# Patient Record
(demographics unavailable — no encounter records)

---

## 2024-10-18 NOTE — HISTORY OF PRESENT ILLNESS
[Mother] : mother [Breast milk] : breast milk [Formula ___ oz/feed] : [unfilled] oz of formula per feed [Hours between feeds ___] : Child is fed every [unfilled] hours [___ Feeding per 24 hrs] : a  total of [unfilled] feedings in 24 hours [Normal] : Normal [In Bassinet/Crib] : sleeps in bassinet/crib [On back] : sleeps on back [Tummy time] : tummy time [Screen time only for video chatting] : screen time only for video chatting [No] : No cigarette smoke exposure [Water heater temperature set at <120 degrees F] : Water heater temperature set at <120 degrees F [Rear facing car seat in back seat] : Rear facing car seat in back seat [Carbon Monoxide Detectors] : Carbon monoxide detectors at home [Smoke Detectors] : Smoke detectors at home. [NO] : No [Co-sleeping] : no co-sleeping [FreeTextEntry7] : 4 month old presenting for well visit. Had COVID19 infection, now resolved.  [de-identified] : No acute concerns. [de-identified] : No solids  [FreeTextEntry1] : SDOH Screening Questionnaire SDOH (Social Determinants of Health) Questionnaire: 1. Housing: Do you worry that in the upcoming months, your family, or child, may not have a safe or stable place to live? no 2. Food security: Within the last 12 months, did the food you bought not last and you did not have money to buy more? no 3. Community: Do you need help getting public benefits like food stamps or WIC? no 4. Transportation: Does your child have chronic medical condition and therefore struggle with transportation to attend medical appointments? no 5. Healthcare Access: Do you need help getting health or dental insurance? no Result: Negative Screen. No further intervention needed.

## 2024-10-18 NOTE — DISCUSSION/SUMMARY
[Family Functioning] : family functioning [Nutritional Adequacy and Growth] : nutritional adequacy and growth [Infant Development] : infant development [Oral Health] : oral health [Safety] : safety [] : The components of the vaccine(s) to be administered today are listed in the plan of care. The disease(s) for which the vaccine(s) are intended to prevent and the risks have been discussed with the caretaker.  The risks are also included in the appropriate vaccination information statements which have been provided to the patient's caregiver.  The caregiver has given consent to vaccinate. [FreeTextEntry1] : Assessment: 4-month-old FEMALE, with resolved COVID19 infection, presents for well visit. Maternal depression screen passed. Growth and Development appropriate for age. No head lag, ready for introduction of solid foods.  PLAN ROUTINE HEALTH CARE VISIT: Age appropriate anticipatory guidance provided. Infant feeding discussed. Continue BF or Formula. 8-12 feedings / day. No head lag: Discussed Introduction of solids foods q 3 -4 days. Stage 1. Avoid whole milk and honey until 1 year of age. Avoid choking hazards such as: nuts, hot dogs, un-cut grapes, hot dogs, fruits with skins, hard candies and balloons.  Teething care reviewed. MultiVitamin Daily 1 mL daily. Continue monitored tummy time for upper body control. Immunizations: Agreeable to Pentacel, Prevnar and Rota at this visit. Return to clinic in 2 months for WCC  & PRN.  Caretaker verbalized understanding of the aforementioned plan above. Caregiver in agreement of the plan. All questions answered.

## 2024-10-18 NOTE — PHYSICAL EXAM
[Alert] : alert [Normocephalic] : normocephalic [Flat Open Anterior [place]] : flat open anterior fontanelle [Red Reflex] : red reflex bilateral [PERRL] : PERRL [Normally Placed Ears] : normally placed ears [Auricles Well Formed] : auricles well formed [Clear Tympanic membranes] : clear tympanic membranes [Light reflex present] : light reflex present [Bony landmarks visible] : bony landmarks visible [Nares Patent] : nares patent [Palate Intact] : palate intact [Uvula Midline] : uvula midline [Symmetric Chest Rise] : symmetric chest rise [Clear to Auscultation Bilaterally] : clear to auscultation bilaterally [Regular Rate and Rhythm] : regular rate and rhythm [S1, S2 present] : S1, S2 present [+2 Femoral Pulses] : (+) 2 femoral pulses [Soft] : soft [Bowel Sounds] : bowel sounds present [External Genitalia] : normal external genitalia [Normal Vaginal Introitus] : normal vaginal introitus [Patent] : patent [Normally Placed] : normally placed [No Abnormal Lymph Nodes Palpated] : no abnormal lymph nodes palpated [Startle Reflex] : startle reflex present [Plantar Grasp] : plantar grasp reflex present [Symmetric Lisa] : symmetric lisa [Acute Distress] : no acute distress [Discharge] : no discharge [Palpable Masses] : no palpable masses [Murmurs] : no murmurs [Tender] : nontender [Distended] : nondistended [Hepatomegaly] : no hepatomegaly [Splenomegaly] : no splenomegaly [Clitoromegaly] : no clitoromegaly [Newsome-Ortolani] : negative Newsome-Ortolani [Allis Sign] : negative Allis sign [Spinal Dimple] : no spinal dimple [Tuft of Hair] : no tuft of hair [Rash or Lesions] : no rash/lesions [de-identified] : nevus simplex

## 2024-12-18 NOTE — PHYSICAL EXAM
[Alert] : alert [Normocephalic] : normocephalic [Flat Open Anterior [place]] : flat open anterior fontanelle [Red Reflex] : red reflex bilateral [PERRL] : PERRL [Normally Placed Ears] : normally placed ears [Auricles Well Formed] : auricles well formed [Clear Tympanic membranes] : clear tympanic membranes [Light reflex present] : light reflex present [Bony landmarks visible] : bony landmarks visible [Nares Patent] : nares patent [Palate Intact] : palate intact [Uvula Midline] : uvula midline [Supple, full passive range of motion] : supple, full passive range of motion [Symmetric Chest Rise] : symmetric chest rise [Clear to Auscultation Bilaterally] : clear to auscultation bilaterally [Regular Rate and Rhythm] : regular rate and rhythm [S1, S2 present] : S1, S2 present [+2 Femoral Pulses] : (+) 2 femoral pulses [Soft] : soft [Bowel Sounds] : bowel sounds present [Normal External Genitalia] : normal external genitalia [Normal Vaginal Introitus] : normal vaginal introitus [Patent] : patent [Normally Placed] : normally placed [No Abnormal Lymph Nodes Palpated] : no abnormal lymph nodes palpated [Symmetric Buttocks Creases] : symmetric buttocks creases [Plantar Grasp] : plantar grasp reflex present [Cranial Nerves Grossly Intact] : cranial nerves grossly intact [Acute Distress] : no acute distress [Discharge] : no discharge [Tooth Eruption] : no tooth eruption [Palpable Masses] : no palpable masses [Murmurs] : no murmurs [Tender] : nontender [Distended] : nondistended [Hepatomegaly] : no hepatomegaly [Splenomegaly] : no splenomegaly [Clitoromegaly] : no clitoromegaly [Newsome-Ortolani] : negative Newsome-Ortolani [Allis Sign] : negative Allis sign [Spinal Dimple] : no spinal dimple [Tuft of Hair] : no tuft of hair [Rash or Lesions] : no rash/lesions

## 2024-12-18 NOTE — HISTORY OF PRESENT ILLNESS
[Mother] : mother [Breast milk] : breast milk [Formula ___ oz/feed] : [unfilled] oz of formula per feed [___ voids per day] : [unfilled] voids per day [Frequency of stools: ___] : Frequency of stools: [unfilled]  stools [per day] : per day. [Dark green] : dark green [Yellow] : yellow [In Bassinet/Crib] : sleeps in bassinet/crib [On back] : sleeps on back [Sleeps 12-16 hours per 24 hours (including naps)] : sleeps 12-16 hours per 24 hours (including naps) [Tummy time] : tummy time [No] : No cigarette smoke exposure [Water heater temperature set at <120 degrees F] : Water heater temperature set at <120 degrees F [Rear facing car seat in back seat] : Rear facing car seat in back seat [Carbon Monoxide Detectors] : Carbon monoxide detectors at home [Smoke Detectors] : Smoke detectors at home. [NO] : No [Cereal] : cereal [Fruits] : no fruits [Vegetables] : no vegetables [Egg] : no egg [Meat] : no meat [Fish] : no fish [Peanut] : no peanut [Dairy] : no dairy [Co-sleeping] : no co-sleeping [Loose bedding, pillow, toys, and/or bumpers in crib] : no loose bedding, pillow, toys, and/or bumpers in crib [Pacifier use] : not using pacifier [Screen time only for video chatting] : screen time not just for video chatting [Exposure to electronic nicotine delivery system] : No exposure to electronic nicotine delivery system [de-identified] : Child here for WCC. Mom also informed us that child had viral infection with fever that brought her to the ER 2 months ago [de-identified] : excessive mucous production [de-identified] : 20 minutes each side breast fed every 3 hours, eats oatmel baby cereal ( [place]) as well as puree 4 bottle feeds per day an hour after breast, formula is gentlelease  [de-identified] : no screen time at all [FreeTextEntry1] : Child here for [facility]. Mom also informed us that child had viral infection with fever that brought her to the ER 2 months ago    SDOH Screening Questionnaire  SDOH (Social Determinants of Health) Questionnaire: 1. Housing: Do you worry that in the upcoming months, your family, or child, may not have a safe or stable place to live? No  2. Food security: Within the last 12 months, did the food you bought not last and you did not have money to buy more? No  3. Community: Do you need help getting public benefits like food stamps or WIC? No  4. Transportation: Does your child have chronic medical condition and therefore struggle with transportation to attend medical appointments? No  5. Healthcare Access: Do you need help getting health or dental insurance? No     Result: Negative Screen. No further intervention needed.

## 2024-12-18 NOTE — DISCUSSION/SUMMARY
[Normal Growth] : growth [Normal Development] : development [None] : No medical problems [No Elimination Concerns] : elimination [No Feeding Concerns] : feeding [No Skin Concerns] : skin [Normal Sleep Pattern] : sleep [No Medications] : ~He/She~ is not on any medications [Parent/Guardian] : parent/guardian [] : The components of the vaccine(s) to be administered today are listed in the plan of care. The disease(s) for which the vaccine(s) are intended to prevent and the risks have been discussed with the caretaker.  The risks are also included in the appropriate vaccination information statements which have been provided to the patient's caregiver.  The caregiver has given consent to vaccinate. [FreeTextEntry1] : Impression:     The following 6 month anticipatory guidance topics were discussed and/or handouts given: family functioning, nutritional adequacy and growth, infant development, oral health and safety.     The components of the vaccine(s) to be administered today are listed in the plan of care. The disease(s) for which the vaccine(s) are intended to prevent and the risks have been discussed with the caretaker. The risks are also included in the appropriate vaccination information statements which have been provided to the patient's caregiver. The caregiver has given consent to vaccinate.  Assessment: 6-month-old FEMALE, with resolved COVID19 infection, presents for well visit. Maternal depression screen passed. Growth and Development appropriate for age. Continue introduction of solid foods. PLAN ROUTINE HEALTH CARE VISIT: Age appropriate anticipatory guidance provided. Infant feeding discussed. Continue BF or Formula. 8-12 feedings / day. No head lag: Discussed Introduction of solids foods q 3 -4 days. Stage 1. Avoid whole milk and honey until 1 year of age. Avoid choking hazards such as: nuts, hot dogs, un-cut grapes, hot dogs, fruits with skins, hard candies and balloons. Teething care reviewed. MultiVitamin Daily 1 mL daily. Continue monitored tummy time for upper body control. No head lag.  Immunizations: Pediarix, Hib, PCV20, and Influenza given. Discussed post vaccine care. Return to clinic in 2 months for WCC & PRN.

## 2024-12-18 NOTE — DEVELOPMENTAL MILESTONES
[Pats or smiles at reflection] : pats or smiles at reflection [Begins to turn when name called] : begins to turn when name called [Babbles] : babbles [Reaches for object and transfers] : reaches for object and transfers [Rakes small object with 4 fingers] : rakes small object with 4 fingers [[place] small object on surface] : bangs small object on surface [Passed] : passed [Rolls over prone to supine] : does not roll over prone to supine [Sits briefly without support] : does not sit briefly without support [FreeTextEntry1] : needs donut pillow to sit up in crib

## 2025-01-21 NOTE — DISCUSSION/SUMMARY
[FreeTextEntry1] : 7mo F presents for vaccine visit, though febrile on presentation and now here for sick visit. Febrile in clinic, tylenol given. PE remarkable for congestion and occasional cough. Counseling given for supportive care and antipyretics.   PLAN - RVP for viral etiology - Give 4mL Tylenol and/or Motrin every 4-6 hours as needed for fever. - Consider Pedialyte for hydration - Counseling given for supportive care to maintain hydration. - If hydration does not improve, recommend ED evaluation for dehydration. - Follow up in 2 days if still febrile otherwise. - RTC for 9mo visit and will plan next vaccine visit before then.

## 2025-01-21 NOTE — REVIEW OF SYSTEMS
[Irritable] : irritability [Fussy] : fussy [Congestion] : congestion [Appetite Changes] : appetite changes [Spitting Up] : spitting up [Negative] : Cardiovascular

## 2025-01-21 NOTE — HISTORY OF PRESENT ILLNESS
[Fever] : FEVER [de-identified] : fever [FreeTextEntry6] : 7mo F presents for vaccine visit but is febrile 100.9 in office. When discussed with parents, patient has been febrile x3 days. Tmax 104 deg. Mother giving tylenol and fever defervesces then goes back up. Mother is underdosing the tylenol. Note some cough, congestion. Denies vomiting or diarrhea. UOP decreased, 1-2 wd per day vs usual 5-6. Feeds 3oz every 6h vs usually she takes 6oz every 6h. Not eating much solids right now. Mother says she doesn't cry so not sure if she makes tears.

## 2025-01-21 NOTE — PHYSICAL EXAM
[Irritable] : irritable [Clear Rhinorrhea] : clear rhinorrhea [Congestion] : congestion [Clear to Auscultation Bilaterally] : clear to auscultation bilaterally [NL] : warm, clear [Tired appearing] : not tired appearing [de-identified] : MMM [FreeTextEntry7] : occasional cough [de-identified] : 3 sec cap refill, slightly delayed

## 2025-01-23 NOTE — DISCUSSION/SUMMARY
[FreeTextEntry1] : 7mo F presents for follow up sick visit in the setting of coronavirus. Patient is afebrile and vital signs are within normal limits. Physical exam is remarkable for residual congestion and rhinorrhea. Fevers, decreased UOP, and decreased PO intake have resolved. Patient continues to have some diarrhea. Overall, patient is improved.   PLAN - Counseling given for supportive care to maintain hydration. - Return precautions provided - RTC for 9mo visit and will plan next vaccine visit before then.

## 2025-01-23 NOTE — REVIEW OF SYSTEMS
[Nasal Discharge] : nasal discharge [Nasal Congestion] : nasal congestion [Cough] : cough [Diarrhea] : diarrhea [Negative] : Skin

## 2025-01-23 NOTE — HISTORY OF PRESENT ILLNESS
[de-identified] : Follow up sick visit [FreeTextEntry6] : 7mo F with no PMH presenting for follow up from sick visit 2 days ago. Nasopharyngeal swab was performed at this time and resulted positive for coronavirus. Education regarding Tylenol dosing was accomplished last visit. Since then patient's fevers have been defervescing with the appropriate amount of Tylenol. Patient's last fever was yesterday morning. She is now taking 6oz per bottle, her baseline. Number of wet diapers has also increased and is now at 4 (baseline in 6). Cough and congestion are still present but improved. Mother endorses that patient is less fussy and more herself. Patient still has some diarrhea, that started 5 days ago with the fevers but has yet to resolve. Denies vomiting, increased work of breathing, no sick contacts.

## 2025-05-15 NOTE — DISCUSSION/SUMMARY
[FreeTextEntry1] : This is a 10-month-old F presenting for x2 week rash.   - eczematic rash apparent on the trunk, genitalia, and neck region.  - Mom would like to use Derma-rest on the thin areas of the skin.  -

## 2025-05-15 NOTE — REVIEW OF SYSTEMS
[Crying] : crying [Difficulty with Sleep] : difficulty with sleep [Rash] : rash [Itching] : itching [Negative] : Musculoskeletal [Inconsolable] : consolable [Fussy] : not fussy [Fever] : no fever [Eye Discharge] : no eye discharge [Eye Redness] : no eye redness [Increased Lacrimation] : no increased lacrimation [Ear Tugging] : no ear tugging [Nasal Discharge] : no nasal discharge [Nasal Congestion] : no nasal congestion [Snoring] : no snoring [Mouth Breathing] : no mouth breathing [Swollen Gums] : no swollen gums

## 2025-05-15 NOTE — HISTORY OF PRESENT ILLNESS
[Derm Symptoms] : DERM SYMPTOMS [___ Week(s)] : [unfilled] week(s) [de-identified] : rash x2 weeks [FreeTextEntry7] : trunk, neck, genitalia [FreeTextEntry6] : This is a 10-month-old F presenting to the clinic for pruritic rash x2 weeks. Per mom, the rash has progressively worsened over the past 2 weeks noted to be on the neck, trunk, and genitalia. Mom has tried using Vaseline with no improvement. Mom also mentions, there is a 4-year-old cousin who is sick and lives at home with the patient and the family. Patient is still tolerating feeds, stooling x3/day, and voids x7/day. Mom also notes, that she has asthma, seasonal allergies, and eczema. Mom denies patient of any recent travels, diet changes, medications, fevers, cough, congestion, wheezing, rhinorrhea, difficulty breathing, weight changes, new soaps, or detergents. Patient is UTD on all vaccines. No further complaints or concerns were made at this time.   NKDA

## 2025-05-15 NOTE — PHYSICAL EXAM
[NL] : normotonic [Erythematous] : erythematous [Neck] : neck [Trunk] : trunk [Mons Pubis] : mons pubis

## 2025-07-02 NOTE — PHYSICAL EXAM
[Alert] : alert [Normocephalic] : normocephalic [Closed Anterior [place]] : closed anterior fontanelle [Normally Placed Ears] : normally placed ears [Auricles Well Formed] : auricles well formed [Light reflex present] : light reflex present [Bony landmarks visible] : bony landmarks visible [Nares Patent] : nares patent [Palate Intact] : palate intact [Uvula Midline] : uvula midline [Tooth Eruption] : tooth eruption [Supple, full passive range of motion] : supple, full passive range of motion [Symmetric Chest Rise] : symmetric chest rise [Clear to Auscultation Bilaterally] : clear to auscultation bilaterally [Regular Rate and Rhythm] : regular rate and rhythm [S1, S2 present] : S1, S2 present [+2 Femoral Pulses] : (+) 2 femoral pulses [Soft] : soft [Bowel Sounds] : normoactive bowel sounds [Normal External Genitalia] : normal external genitalia [Normal Vaginal Introitus] : normal vaginal introitus [No Abnormal Lymph Nodes Palpated] : no abnormal lymph nodes palpated [Symmetric Abduction and Rotation of Hips] : symmetric abduction and rotation of hips [Straight] : straight [Cranial Nerves Grossly Intact] : cranial nerves grossly intact [Rash or Lesions] : rash and/or lesion present [Discharge] : no discharge [Palpable Masses] : no palpable masses [Murmurs] : no murmurs [Tender] : nontender [Distended] : nondistended [Hepatomegaly] : no hepatomegaly [Splenomegaly] : no splenomegaly [Clitoromegaly] : no clitoromegaly [Allis Sign] : negative Allis sign [de-identified] : Satellite lesions [de-identified] : erythematous base rash on back, chest, back of the neck, abdomen

## 2025-07-02 NOTE — HISTORY OF PRESENT ILLNESS
[Mother] : mother [Hours between feeds ___] : Child is fed every [unfilled] hours [___ Feeding per 24 hrs] : a  total of [unfilled] feedings in 24 hours [Breast milk] : breast milk [Cow's milk ___ oz/feed] : [unfilled] oz of Cow's milk per feed [Fruit] : fruit [Vegetables] : vegetables [Meat] : meat [Dairy] : dairy [Baby food] : baby food [Finger food] : finger food [Table food] : table food [___ stools per day] : [unfilled]  stools per day [Loose] : loose consistency [___ voids per day] : [unfilled] voids per day [Normal] : Normal [On back] : On back [In crib] : In crib [Wakes up at night] : Wakes up at night [Sippy cup use] : Sippy cup use [Brushing teeth] : Brushing teeth [Bottle in bed] : Bottle in bed [Toothpaste] : Primary Fluoride Source: Toothpaste [Playtime] : Playtime  [No] : Not at  exposure [Water heater temperature set at <120 degrees F] : Water heater temperature set at <120 degrees F [Car seat in back seat] : Car seat in back seat [Smoke Detectors] : Smoke detectors [Carbon Monoxide Detectors] : Carbon monoxide detectors [NO] : No [Exposure to electronic nicotine delivery system] : No exposure to electronic nicotine delivery system [At risk for exposure to TB] : Not at risk for exposure to Tuberculosis [FreeTextEntry7] : 12-month-old female presenting for a routine well-child visit. She is  and eats meals with the family. Parents report a history of mild rashes that have not improved with over-the-counter creams. Although previously prescribed hydrocortisone and triamcinolone, these have not been used. Diaper rash also to be evaluated appears "red". No fever. No vomiting. No diarrhea. No abdominal pain. No ear pain or tugging. No rash. Eating and drinking at baseline. Normal elimination. No known sick contacts. No known COVID exposures. [FreeTextEntry8] : brown color [FreeTextEntry3] : sometimes wakes up [FreeTextEntry1] : SDOH Screening Questionnaire SDOH (Social Determinants of Health) Questionnaire: 1. Housing: Do you worry that in the upcoming months, your family, or child, may not have a safe or stable place to live? no 2. Food security: Within the last 12 months, did the food you bought not last and you did not have money to buy more? no 3. Community: Do you need help getting public benefits like food stamps or WIC? no 4. Transportation: Does your child have chronic medical condition and therefore struggle with transportation to attend medical appointments? no 5. Healthcare Access: Do you need help getting health or dental insurance? no Result: Negative Screen. No further intervention needed.

## 2025-07-02 NOTE — PHYSICAL EXAM
[Alert] : alert [Normocephalic] : normocephalic [Closed Anterior [place]] : closed anterior fontanelle [Normally Placed Ears] : normally placed ears [Auricles Well Formed] : auricles well formed [Light reflex present] : light reflex present [Bony landmarks visible] : bony landmarks visible [Nares Patent] : nares patent [Palate Intact] : palate intact [Uvula Midline] : uvula midline [Tooth Eruption] : tooth eruption [Supple, full passive range of motion] : supple, full passive range of motion [Symmetric Chest Rise] : symmetric chest rise [Clear to Auscultation Bilaterally] : clear to auscultation bilaterally [Regular Rate and Rhythm] : regular rate and rhythm [S1, S2 present] : S1, S2 present [+2 Femoral Pulses] : (+) 2 femoral pulses [Soft] : soft [Bowel Sounds] : normoactive bowel sounds [Normal External Genitalia] : normal external genitalia [Normal Vaginal Introitus] : normal vaginal introitus [No Abnormal Lymph Nodes Palpated] : no abnormal lymph nodes palpated [Symmetric Abduction and Rotation of Hips] : symmetric abduction and rotation of hips [Straight] : straight [Cranial Nerves Grossly Intact] : cranial nerves grossly intact [Rash or Lesions] : rash and/or lesion present [Discharge] : no discharge [Palpable Masses] : no palpable masses [Murmurs] : no murmurs [Tender] : nontender [Distended] : nondistended [Hepatomegaly] : no hepatomegaly [Splenomegaly] : no splenomegaly [Clitoromegaly] : no clitoromegaly [Allis Sign] : negative Allis sign [de-identified] : Satellite lesions [de-identified] : erythematous base rash on back, chest, back of the neck, abdomen

## 2025-07-02 NOTE — DISCUSSION/SUMMARY
[Family Support] : family support [Establishing Routines] : establishing routines [Feeding and Appetite Changes] : feeding and appetite changes [Establishing A Dental Home] : establishing a dental home [Safety] : safety [] : The components of the vaccine(s) to be administered today are listed in the plan of care. The disease(s) for which the vaccine(s) are intended to prevent and the risks have been discussed with the caretaker.  The risks are also included in the appropriate vaccination information statements which have been provided to the patient's caregiver.  The caregiver has given consent to vaccinate. [FreeTextEntry1] :  ASSESSMENT: 12-month-old female presenting for a routine well-child visit. On exam today, eczematous changes are noted on the abdomen, with visible scratching during the visit. A satellite lesions is observed in the diaper area, consistent with possible candidal diaper dermatitis. No fever, irritability, or systemic symptoms reported.  PLAN: Health Care Maintenance:  - Routine immunizations - MMR, varicella, hepatitis A administered. Post vaccine care discussed & potential side effects reviewed - Routine screening labs - CBC and lead level ordered. - Routine care & anticipatory guidance given - Post vaccine care discussed & potential side effects reviewed - Tylenol every 4 hours prn or Motrin every 6 hours prn for pain or fever - Choking hazards reviewed - Referred to dental for routine screen, may brush teeth with toothbrush and smear of fluoridated toothpaste - RTC for 15 month old HCM and prn  ATOPIC DERMATITIS: - Give lukewarm baths and apply emollient immediately following bath. - Recommend daily emollient use with Vaseline up to 4 times daily. - Select soft cotton fabrics when choosing clothing. - Topical steroidal to be used as prescribed PRN 3-5 days on areas of exacerbation. - Side effect of topical steroids discussed. - RTC if skin does not improve or worsens.  DIAPER DERMATITIS: Change diapers frequently. Gentle wiping reviewed. Apply barrier ointment with zinc oxide and petroleum.  Avoid tight diapers, ensure diaper fits well. Prescribed nystatin cream for diaper rash with satellite lesions - apply TID 7 days. RTC if persists.  Caretaker expressed understanding of the plan and agrees. All questions were answered.